# Patient Record
Sex: MALE | Race: NATIVE HAWAIIAN OR OTHER PACIFIC ISLANDER | HISPANIC OR LATINO | URBAN - METROPOLITAN AREA
[De-identification: names, ages, dates, MRNs, and addresses within clinical notes are randomized per-mention and may not be internally consistent; named-entity substitution may affect disease eponyms.]

---

## 2024-04-24 ENCOUNTER — INPATIENT (INPATIENT)
Facility: HOSPITAL | Age: 29
LOS: 4 days | Discharge: HOME | DRG: 494 | End: 2024-04-29
Attending: STUDENT IN AN ORGANIZED HEALTH CARE EDUCATION/TRAINING PROGRAM | Admitting: STUDENT IN AN ORGANIZED HEALTH CARE EDUCATION/TRAINING PROGRAM
Payer: SELF-PAY

## 2024-04-24 VITALS
SYSTOLIC BLOOD PRESSURE: 122 MMHG | HEART RATE: 100 BPM | RESPIRATION RATE: 20 BRPM | DIASTOLIC BLOOD PRESSURE: 74 MMHG | OXYGEN SATURATION: 100 % | TEMPERATURE: 98 F | WEIGHT: 169.98 LBS | HEIGHT: 67 IN

## 2024-04-24 DIAGNOSIS — S82.209A UNSPECIFIED FRACTURE OF SHAFT OF UNSPECIFIED TIBIA, INITIAL ENCOUNTER FOR CLOSED FRACTURE: ICD-10-CM

## 2024-04-24 LAB
ALBUMIN SERPL ELPH-MCNC: 4.6 G/DL — SIGNIFICANT CHANGE UP (ref 3.3–5)
ALP SERPL-CCNC: 78 U/L — SIGNIFICANT CHANGE UP (ref 40–120)
ALT FLD-CCNC: 23 U/L — SIGNIFICANT CHANGE UP (ref 10–45)
ANION GAP SERPL CALC-SCNC: 18 MMOL/L — HIGH (ref 5–17)
APTT BLD: 31.1 SEC — SIGNIFICANT CHANGE UP (ref 24.5–35.6)
AST SERPL-CCNC: 45 U/L — HIGH (ref 10–40)
BASOPHILS # BLD AUTO: 0.06 K/UL — SIGNIFICANT CHANGE UP (ref 0–0.2)
BASOPHILS NFR BLD AUTO: 0.5 % — SIGNIFICANT CHANGE UP (ref 0–2)
BILIRUB SERPL-MCNC: 0.5 MG/DL — SIGNIFICANT CHANGE UP (ref 0.2–1.2)
BLD GP AB SCN SERPL QL: NEGATIVE — SIGNIFICANT CHANGE UP
BUN SERPL-MCNC: 7 MG/DL — SIGNIFICANT CHANGE UP (ref 7–23)
CALCIUM SERPL-MCNC: 8.4 MG/DL — SIGNIFICANT CHANGE UP (ref 8.4–10.5)
CHLORIDE SERPL-SCNC: 98 MMOL/L — SIGNIFICANT CHANGE UP (ref 96–108)
CO2 SERPL-SCNC: 21 MMOL/L — LOW (ref 22–31)
CREAT SERPL-MCNC: 0.56 MG/DL — SIGNIFICANT CHANGE UP (ref 0.5–1.3)
EGFR: 139 ML/MIN/1.73M2 — SIGNIFICANT CHANGE UP
EOSINOPHIL # BLD AUTO: 0.01 K/UL — SIGNIFICANT CHANGE UP (ref 0–0.5)
EOSINOPHIL NFR BLD AUTO: 0.1 % — SIGNIFICANT CHANGE UP (ref 0–6)
ETHANOL SERPL-MCNC: 337 MG/DL — HIGH (ref 0–10)
GLUCOSE SERPL-MCNC: 103 MG/DL — HIGH (ref 70–99)
HCT VFR BLD CALC: 39.9 % — SIGNIFICANT CHANGE UP (ref 39–50)
HGB BLD-MCNC: 14 G/DL — SIGNIFICANT CHANGE UP (ref 13–17)
IMM GRANULOCYTES NFR BLD AUTO: 0.3 % — SIGNIFICANT CHANGE UP (ref 0–0.9)
INR BLD: 1 RATIO — SIGNIFICANT CHANGE UP (ref 0.85–1.18)
LACTATE SERPL-SCNC: 2.1 MMOL/L — HIGH (ref 0.5–2)
LIDOCAIN IGE QN: 22 U/L — SIGNIFICANT CHANGE UP (ref 7–60)
LYMPHOCYTES # BLD AUTO: 1.21 K/UL — SIGNIFICANT CHANGE UP (ref 1–3.3)
LYMPHOCYTES # BLD AUTO: 10.5 % — LOW (ref 13–44)
MCHC RBC-ENTMCNC: 31 PG — SIGNIFICANT CHANGE UP (ref 27–34)
MCHC RBC-ENTMCNC: 35.1 GM/DL — SIGNIFICANT CHANGE UP (ref 32–36)
MCV RBC AUTO: 88.3 FL — SIGNIFICANT CHANGE UP (ref 80–100)
MONOCYTES # BLD AUTO: 0.44 K/UL — SIGNIFICANT CHANGE UP (ref 0–0.9)
MONOCYTES NFR BLD AUTO: 3.8 % — SIGNIFICANT CHANGE UP (ref 2–14)
NEUTROPHILS # BLD AUTO: 9.79 K/UL — HIGH (ref 1.8–7.4)
NEUTROPHILS NFR BLD AUTO: 84.8 % — HIGH (ref 43–77)
NRBC # BLD: 0 /100 WBCS — SIGNIFICANT CHANGE UP (ref 0–0)
PLATELET # BLD AUTO: 285 K/UL — SIGNIFICANT CHANGE UP (ref 150–400)
POTASSIUM SERPL-MCNC: 3.9 MMOL/L — SIGNIFICANT CHANGE UP (ref 3.5–5.3)
POTASSIUM SERPL-SCNC: 3.9 MMOL/L — SIGNIFICANT CHANGE UP (ref 3.5–5.3)
PROT SERPL-MCNC: 7.1 G/DL — SIGNIFICANT CHANGE UP (ref 6–8.3)
PROTHROM AB SERPL-ACNC: 10.5 SEC — SIGNIFICANT CHANGE UP (ref 9.5–13)
RBC # BLD: 4.52 M/UL — SIGNIFICANT CHANGE UP (ref 4.2–5.8)
RBC # FLD: 14.7 % — HIGH (ref 10.3–14.5)
RH IG SCN BLD-IMP: POSITIVE — SIGNIFICANT CHANGE UP
SODIUM SERPL-SCNC: 137 MMOL/L — SIGNIFICANT CHANGE UP (ref 135–145)
WBC # BLD: 11.55 K/UL — HIGH (ref 3.8–10.5)
WBC # FLD AUTO: 11.55 K/UL — HIGH (ref 3.8–10.5)

## 2024-04-24 PROCEDURE — 99221 1ST HOSP IP/OBS SF/LOW 40: CPT | Mod: 57

## 2024-04-24 PROCEDURE — 71045 X-RAY EXAM CHEST 1 VIEW: CPT | Mod: 26

## 2024-04-24 PROCEDURE — 76377 3D RENDER W/INTRP POSTPROCES: CPT | Mod: 26

## 2024-04-24 PROCEDURE — 73610 X-RAY EXAM OF ANKLE: CPT | Mod: 26,LT

## 2024-04-24 PROCEDURE — 73552 X-RAY EXAM OF FEMUR 2/>: CPT | Mod: 26,LT

## 2024-04-24 PROCEDURE — 73590 X-RAY EXAM OF LOWER LEG: CPT | Mod: 26,LT

## 2024-04-24 PROCEDURE — 73562 X-RAY EXAM OF KNEE 3: CPT | Mod: 26,LT

## 2024-04-24 PROCEDURE — 73502 X-RAY EXAM HIP UNI 2-3 VIEWS: CPT | Mod: 26,LT

## 2024-04-24 PROCEDURE — 73590 X-RAY EXAM OF LOWER LEG: CPT | Mod: 26,LT,77

## 2024-04-24 PROCEDURE — 74177 CT ABD & PELVIS W/CONTRAST: CPT | Mod: 26,MC

## 2024-04-24 PROCEDURE — 93010 ELECTROCARDIOGRAM REPORT: CPT

## 2024-04-24 PROCEDURE — 73610 X-RAY EXAM OF ANKLE: CPT | Mod: 26,LT,77

## 2024-04-24 PROCEDURE — 99285 EMERGENCY DEPT VISIT HI MDM: CPT

## 2024-04-24 PROCEDURE — 71260 CT THORAX DX C+: CPT | Mod: 26,MC

## 2024-04-24 PROCEDURE — 73130 X-RAY EXAM OF HAND: CPT | Mod: 26,50

## 2024-04-24 PROCEDURE — 72125 CT NECK SPINE W/O DYE: CPT | Mod: 26,MC

## 2024-04-24 PROCEDURE — 70450 CT HEAD/BRAIN W/O DYE: CPT | Mod: 26,MC

## 2024-04-24 PROCEDURE — 73700 CT LOWER EXTREMITY W/O DYE: CPT | Mod: 26,LT,MC

## 2024-04-24 PROCEDURE — 73630 X-RAY EXAM OF FOOT: CPT | Mod: 26,LT

## 2024-04-24 RX ORDER — INFLUENZA VIRUS VACCINE 15; 15; 15; 15 UG/.5ML; UG/.5ML; UG/.5ML; UG/.5ML
0.5 SUSPENSION INTRAMUSCULAR ONCE
Refills: 0 | Status: DISCONTINUED | OUTPATIENT
Start: 2024-04-24 | End: 2024-04-29

## 2024-04-24 RX ORDER — THIAMINE MONONITRATE (VIT B1) 100 MG
100 TABLET ORAL DAILY
Refills: 0 | Status: DISCONTINUED | OUTPATIENT
Start: 2024-04-24 | End: 2024-04-25

## 2024-04-24 RX ORDER — ACETAMINOPHEN 500 MG
975 TABLET ORAL EVERY 8 HOURS
Refills: 0 | Status: DISCONTINUED | OUTPATIENT
Start: 2024-04-24 | End: 2024-04-25

## 2024-04-24 RX ORDER — ACETAMINOPHEN 500 MG
1000 TABLET ORAL ONCE
Refills: 0 | Status: COMPLETED | OUTPATIENT
Start: 2024-04-24 | End: 2024-04-24

## 2024-04-24 RX ORDER — ACETAMINOPHEN 500 MG
650 TABLET ORAL EVERY 6 HOURS
Refills: 0 | Status: DISCONTINUED | OUTPATIENT
Start: 2024-04-24 | End: 2024-04-24

## 2024-04-24 RX ORDER — POLYETHYLENE GLYCOL 3350 17 G/17G
17 POWDER, FOR SOLUTION ORAL DAILY
Refills: 0 | Status: DISCONTINUED | OUTPATIENT
Start: 2024-04-24 | End: 2024-04-25

## 2024-04-24 RX ORDER — OXYCODONE HYDROCHLORIDE 5 MG/1
5 TABLET ORAL EVERY 4 HOURS
Refills: 0 | Status: DISCONTINUED | OUTPATIENT
Start: 2024-04-24 | End: 2024-04-25

## 2024-04-24 RX ORDER — SENNA PLUS 8.6 MG/1
2 TABLET ORAL AT BEDTIME
Refills: 0 | Status: DISCONTINUED | OUTPATIENT
Start: 2024-04-24 | End: 2024-04-25

## 2024-04-24 RX ORDER — SODIUM CHLORIDE 9 MG/ML
1000 INJECTION INTRAMUSCULAR; INTRAVENOUS; SUBCUTANEOUS
Refills: 0 | Status: DISCONTINUED | OUTPATIENT
Start: 2024-04-24 | End: 2024-04-24

## 2024-04-24 RX ORDER — TRAMADOL HYDROCHLORIDE 50 MG/1
50 TABLET ORAL EVERY 6 HOURS
Refills: 0 | Status: DISCONTINUED | OUTPATIENT
Start: 2024-04-24 | End: 2024-04-25

## 2024-04-24 RX ORDER — MORPHINE SULFATE 50 MG/1
4 CAPSULE, EXTENDED RELEASE ORAL ONCE
Refills: 0 | Status: DISCONTINUED | OUTPATIENT
Start: 2024-04-24 | End: 2024-04-24

## 2024-04-24 RX ORDER — SODIUM CHLORIDE 9 MG/ML
1000 INJECTION INTRAMUSCULAR; INTRAVENOUS; SUBCUTANEOUS
Refills: 0 | Status: DISCONTINUED | OUTPATIENT
Start: 2024-04-24 | End: 2024-04-25

## 2024-04-24 RX ORDER — OXYCODONE HYDROCHLORIDE 5 MG/1
10 TABLET ORAL EVERY 4 HOURS
Refills: 0 | Status: DISCONTINUED | OUTPATIENT
Start: 2024-04-24 | End: 2024-04-25

## 2024-04-24 RX ORDER — ONDANSETRON 8 MG/1
4 TABLET, FILM COATED ORAL EVERY 6 HOURS
Refills: 0 | Status: DISCONTINUED | OUTPATIENT
Start: 2024-04-24 | End: 2024-04-25

## 2024-04-24 RX ORDER — SODIUM CHLORIDE 9 MG/ML
1000 INJECTION INTRAMUSCULAR; INTRAVENOUS; SUBCUTANEOUS ONCE
Refills: 0 | Status: COMPLETED | OUTPATIENT
Start: 2024-04-24 | End: 2024-04-24

## 2024-04-24 RX ORDER — CEFAZOLIN SODIUM 1 G
2000 VIAL (EA) INJECTION ONCE
Refills: 0 | Status: COMPLETED | OUTPATIENT
Start: 2024-04-24 | End: 2024-04-24

## 2024-04-24 RX ADMIN — Medication 100 MILLIGRAM(S): at 19:15

## 2024-04-24 RX ADMIN — Medication 975 MILLIGRAM(S): at 21:37

## 2024-04-24 RX ADMIN — SODIUM CHLORIDE 125 MILLILITER(S): 9 INJECTION INTRAMUSCULAR; INTRAVENOUS; SUBCUTANEOUS at 20:42

## 2024-04-24 RX ADMIN — MORPHINE SULFATE 4 MILLIGRAM(S): 50 CAPSULE, EXTENDED RELEASE ORAL at 18:12

## 2024-04-24 RX ADMIN — MORPHINE SULFATE 4 MILLIGRAM(S): 50 CAPSULE, EXTENDED RELEASE ORAL at 17:35

## 2024-04-24 RX ADMIN — SODIUM CHLORIDE 1000 MILLILITER(S): 9 INJECTION INTRAMUSCULAR; INTRAVENOUS; SUBCUTANEOUS at 16:04

## 2024-04-24 RX ADMIN — Medication 400 MILLIGRAM(S): at 16:04

## 2024-04-24 RX ADMIN — Medication 1000 MILLIGRAM(S): at 17:10
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
## 2024-04-24 NOTE — ED PROVIDER NOTE - PHYSICAL EXAMINATION
CONSTITUTIONAL: Patient is awake, alert and oriented x 2, intoxicated;   HEAD: NCAT  EYES: PERRL bilaterally,   ENT: Airway patent, Nasal mucosa clear.  NECK: Supple,   LUNGS: CTA B/L,  HEART: RRR.+S1S2   ABDOMEN: Soft, non-tender to palpation throughout all four quadrants,  MSK: unable to range left LE, obvious deformity noted to left ankle, distal pulses intact, brisk capillary refill, no midline ttp of cervical, thoracic or lumbar spine;   SKIN: excoriations to left upper extremity, abrasion to left knee, dried blood to fingernails left hand;   NEURO: intoxicated
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
## 2024-04-24 NOTE — ED ADULT NURSE NOTE - NSFALLRISKINTERV_ED_ALL_ED
Assistance OOB with selected safe patient handling equipment if applicable/Assistance with ambulation/Communicate fall risk and risk factors to all staff, patient, and family/Monitor gait and stability/Provide visual cue: yellow wristband, yellow gown, etc/Reinforce activity limits and safety measures with patient and family/Call bell, personal items and telephone in reach/Instruct patient to call for assistance before getting out of bed/chair/stretcher/Non-slip footwear applied when patient is off stretcher/[place] to call system/Physically safe environment - no spills, clutter or unnecessary equipment/Purposeful Proactive Rounding/Room/bathroom lighting operational, light cord in reach

## 2024-04-24 NOTE — ED PROVIDER NOTE - PLAN OF CARE
L closed Displaced transverse fracture of the medial malleolus. L fibula fx Displaced transverse fracture of the medial malleolus.    Acute, comminuted displaced fracture of the distal fibular diaphysis with   mild foreshortening at the fracture site, anterolateral displacement of   the distal fracture fragment, and apex anterior angulation.    Widening of the syndesmosis, compatible with syndesmotic injury.

## 2024-04-24 NOTE — ED ADULT NURSE REASSESSMENT NOTE - NS ED NURSE REASSESS COMMENT FT1
Handoff taken from RN Jaye in gold. Introduced self to Patient, resting comfortably, breathing unlabored, VSS, no signs of acute distress, side rails raised, call bell within reach, comfort and safety maintained. Seen by surgery, awaiting recs, updated pt on POC. No complaints at this time, denies pain of LLE.

## 2024-04-24 NOTE — PATIENT PROFILE ADULT - FALL HARM RISK - HARM RISK INTERVENTIONS
Assistance with ambulation/Assistance OOB with selected safe patient handling equipment/Communicate Risk of Fall with Harm to all staff/Discuss with provider need for PT consult/Monitor gait and stability/Reinforce activity limits and safety measures with patient and family/Tailored Fall Risk Interventions/Visual Cue: Yellow wristband and red socks/Bed in lowest position, wheels locked, appropriate side rails in place/Call bell, personal items and telephone in reach/Instruct patient to call for assistance before getting out of bed or chair/Non-slip footwear when patient is out of bed/[place] to call system/Physically safe environment - no spills, clutter or unnecessary equipment/Purposeful Proactive Rounding/Room/bathroom lighting operational, light cord in reach

## 2024-04-25 LAB
ANION GAP SERPL CALC-SCNC: 10 MMOL/L — SIGNIFICANT CHANGE UP (ref 5–17)
ANION GAP SERPL CALC-SCNC: 14 MMOL/L — SIGNIFICANT CHANGE UP (ref 5–17)
ANION GAP SERPL CALC-SCNC: 14 MMOL/L — SIGNIFICANT CHANGE UP (ref 5–17)
APTT BLD: 31.4 SEC — SIGNIFICANT CHANGE UP (ref 24.5–35.6)
BASE EXCESS BLDV CALC-SCNC: -0.4 MMOL/L — SIGNIFICANT CHANGE UP (ref -2–3)
BASE EXCESS BLDV CALC-SCNC: -1.8 MMOL/L — SIGNIFICANT CHANGE UP (ref -2–3)
BASOPHILS # BLD AUTO: 0.02 K/UL — SIGNIFICANT CHANGE UP (ref 0–0.2)
BASOPHILS NFR BLD AUTO: 0.3 % — SIGNIFICANT CHANGE UP (ref 0–2)
BLD GP AB SCN SERPL QL: NEGATIVE — SIGNIFICANT CHANGE UP
BUN SERPL-MCNC: 10 MG/DL — SIGNIFICANT CHANGE UP (ref 7–23)
BUN SERPL-MCNC: 8 MG/DL — SIGNIFICANT CHANGE UP (ref 7–23)
BUN SERPL-MCNC: 8 MG/DL — SIGNIFICANT CHANGE UP (ref 7–23)
CA-I SERPL-SCNC: 1.11 MMOL/L — LOW (ref 1.15–1.33)
CA-I SERPL-SCNC: 1.17 MMOL/L — SIGNIFICANT CHANGE UP (ref 1.15–1.33)
CALCIUM SERPL-MCNC: 8.3 MG/DL — LOW (ref 8.4–10.5)
CALCIUM SERPL-MCNC: 8.4 MG/DL — SIGNIFICANT CHANGE UP (ref 8.4–10.5)
CALCIUM SERPL-MCNC: 8.4 MG/DL — SIGNIFICANT CHANGE UP (ref 8.4–10.5)
CHLORIDE BLDV-SCNC: 102 MMOL/L — SIGNIFICANT CHANGE UP (ref 96–108)
CHLORIDE BLDV-SCNC: 103 MMOL/L — SIGNIFICANT CHANGE UP (ref 96–108)
CHLORIDE SERPL-SCNC: 103 MMOL/L — SIGNIFICANT CHANGE UP (ref 96–108)
CHLORIDE SERPL-SCNC: 104 MMOL/L — SIGNIFICANT CHANGE UP (ref 96–108)
CHLORIDE SERPL-SCNC: 104 MMOL/L — SIGNIFICANT CHANGE UP (ref 96–108)
CO2 BLDV-SCNC: 24 MMOL/L — SIGNIFICANT CHANGE UP (ref 22–26)
CO2 BLDV-SCNC: 26 MMOL/L — SIGNIFICANT CHANGE UP (ref 22–26)
CO2 SERPL-SCNC: 21 MMOL/L — LOW (ref 22–31)
CO2 SERPL-SCNC: 21 MMOL/L — LOW (ref 22–31)
CO2 SERPL-SCNC: 22 MMOL/L — SIGNIFICANT CHANGE UP (ref 22–31)
CREAT SERPL-MCNC: 0.57 MG/DL — SIGNIFICANT CHANGE UP (ref 0.5–1.3)
CREAT SERPL-MCNC: 0.61 MG/DL — SIGNIFICANT CHANGE UP (ref 0.5–1.3)
CREAT SERPL-MCNC: 0.61 MG/DL — SIGNIFICANT CHANGE UP (ref 0.5–1.3)
EGFR: 135 ML/MIN/1.73M2 — SIGNIFICANT CHANGE UP
EGFR: 135 ML/MIN/1.73M2 — SIGNIFICANT CHANGE UP
EGFR: 138 ML/MIN/1.73M2 — SIGNIFICANT CHANGE UP
EOSINOPHIL # BLD AUTO: 0 K/UL — SIGNIFICANT CHANGE UP (ref 0–0.5)
EOSINOPHIL NFR BLD AUTO: 0 % — SIGNIFICANT CHANGE UP (ref 0–6)
ETHANOL SERPL-MCNC: 104 MG/DL — HIGH (ref 0–10)
ETHANOL SERPL-MCNC: <10 MG/DL — SIGNIFICANT CHANGE UP (ref 0–10)
GAS PNL BLDV: 135 MMOL/L — LOW (ref 136–145)
GAS PNL BLDV: 137 MMOL/L — SIGNIFICANT CHANGE UP (ref 136–145)
GAS PNL BLDV: SIGNIFICANT CHANGE UP
GAS PNL BLDV: SIGNIFICANT CHANGE UP
GLUCOSE BLDV-MCNC: 100 MG/DL — HIGH (ref 70–99)
GLUCOSE BLDV-MCNC: 99 MG/DL — SIGNIFICANT CHANGE UP (ref 70–99)
GLUCOSE SERPL-MCNC: 110 MG/DL — HIGH (ref 70–99)
GLUCOSE SERPL-MCNC: 110 MG/DL — HIGH (ref 70–99)
GLUCOSE SERPL-MCNC: 129 MG/DL — HIGH (ref 70–99)
HCO3 BLDV-SCNC: 23 MMOL/L — SIGNIFICANT CHANGE UP (ref 22–29)
HCO3 BLDV-SCNC: 25 MMOL/L — SIGNIFICANT CHANGE UP (ref 22–29)
HCT VFR BLD CALC: 36.7 % — LOW (ref 39–50)
HCT VFR BLD CALC: 38.1 % — LOW (ref 39–50)
HCT VFR BLDA CALC: 39 % — SIGNIFICANT CHANGE UP (ref 39–51)
HCT VFR BLDA CALC: 39 % — SIGNIFICANT CHANGE UP (ref 39–51)
HGB BLD CALC-MCNC: 12.9 G/DL — SIGNIFICANT CHANGE UP (ref 12.6–17.4)
HGB BLD CALC-MCNC: 13 G/DL — SIGNIFICANT CHANGE UP (ref 12.6–17.4)
HGB BLD-MCNC: 12 G/DL — LOW (ref 13–17)
HGB BLD-MCNC: 12.7 G/DL — LOW (ref 13–17)
IMM GRANULOCYTES NFR BLD AUTO: 0.3 % — SIGNIFICANT CHANGE UP (ref 0–0.9)
INR BLD: 1.05 RATIO — SIGNIFICANT CHANGE UP (ref 0.85–1.18)
LACTATE BLDV-MCNC: 0.6 MMOL/L — SIGNIFICANT CHANGE UP (ref 0.5–2)
LACTATE BLDV-MCNC: 2.1 MMOL/L — HIGH (ref 0.5–2)
LYMPHOCYTES # BLD AUTO: 0.37 K/UL — LOW (ref 1–3.3)
LYMPHOCYTES # BLD AUTO: 5.8 % — LOW (ref 13–44)
MAGNESIUM SERPL-MCNC: 2 MG/DL — SIGNIFICANT CHANGE UP (ref 1.6–2.6)
MCHC RBC-ENTMCNC: 30 PG — SIGNIFICANT CHANGE UP (ref 27–34)
MCHC RBC-ENTMCNC: 30.2 PG — SIGNIFICANT CHANGE UP (ref 27–34)
MCHC RBC-ENTMCNC: 32.7 GM/DL — SIGNIFICANT CHANGE UP (ref 32–36)
MCHC RBC-ENTMCNC: 33.3 GM/DL — SIGNIFICANT CHANGE UP (ref 32–36)
MCV RBC AUTO: 90.7 FL — SIGNIFICANT CHANGE UP (ref 80–100)
MCV RBC AUTO: 91.8 FL — SIGNIFICANT CHANGE UP (ref 80–100)
MONOCYTES # BLD AUTO: 0.13 K/UL — SIGNIFICANT CHANGE UP (ref 0–0.9)
MONOCYTES NFR BLD AUTO: 2 % — SIGNIFICANT CHANGE UP (ref 2–14)
NEUTROPHILS # BLD AUTO: 5.84 K/UL — SIGNIFICANT CHANGE UP (ref 1.8–7.4)
NEUTROPHILS NFR BLD AUTO: 91.6 % — HIGH (ref 43–77)
NRBC # BLD: 0 /100 WBCS — SIGNIFICANT CHANGE UP (ref 0–0)
NRBC # BLD: 0 /100 WBCS — SIGNIFICANT CHANGE UP (ref 0–0)
PCO2 BLDV: 39 MMHG — LOW (ref 42–55)
PCO2 BLDV: 42 MMHG — SIGNIFICANT CHANGE UP (ref 42–55)
PH BLDV: 7.38 — SIGNIFICANT CHANGE UP (ref 7.32–7.43)
PH BLDV: 7.38 — SIGNIFICANT CHANGE UP (ref 7.32–7.43)
PHOSPHATE SERPL-MCNC: 3.2 MG/DL — SIGNIFICANT CHANGE UP (ref 2.5–4.5)
PLATELET # BLD AUTO: 232 K/UL — SIGNIFICANT CHANGE UP (ref 150–400)
PLATELET # BLD AUTO: 259 K/UL — SIGNIFICANT CHANGE UP (ref 150–400)
PO2 BLDV: 89 MMHG — HIGH (ref 25–45)
PO2 BLDV: 95 MMHG — HIGH (ref 25–45)
POTASSIUM BLDV-SCNC: 3.6 MMOL/L — SIGNIFICANT CHANGE UP (ref 3.5–5.1)
POTASSIUM BLDV-SCNC: 3.9 MMOL/L — SIGNIFICANT CHANGE UP (ref 3.5–5.1)
POTASSIUM SERPL-MCNC: 3.7 MMOL/L — SIGNIFICANT CHANGE UP (ref 3.5–5.3)
POTASSIUM SERPL-MCNC: 3.7 MMOL/L — SIGNIFICANT CHANGE UP (ref 3.5–5.3)
POTASSIUM SERPL-MCNC: 4.5 MMOL/L — SIGNIFICANT CHANGE UP (ref 3.5–5.3)
POTASSIUM SERPL-SCNC: 3.7 MMOL/L — SIGNIFICANT CHANGE UP (ref 3.5–5.3)
POTASSIUM SERPL-SCNC: 3.7 MMOL/L — SIGNIFICANT CHANGE UP (ref 3.5–5.3)
POTASSIUM SERPL-SCNC: 4.5 MMOL/L — SIGNIFICANT CHANGE UP (ref 3.5–5.3)
PROTHROM AB SERPL-ACNC: 11 SEC — SIGNIFICANT CHANGE UP (ref 9.5–13)
RBC # BLD: 4 M/UL — LOW (ref 4.2–5.8)
RBC # BLD: 4.2 M/UL — SIGNIFICANT CHANGE UP (ref 4.2–5.8)
RBC # FLD: 14.8 % — HIGH (ref 10.3–14.5)
RBC # FLD: 15.2 % — HIGH (ref 10.3–14.5)
RH IG SCN BLD-IMP: POSITIVE — SIGNIFICANT CHANGE UP
SAO2 % BLDV: 97.9 % — HIGH (ref 67–88)
SAO2 % BLDV: 98.7 % — HIGH (ref 67–88)
SODIUM SERPL-SCNC: 136 MMOL/L — SIGNIFICANT CHANGE UP (ref 135–145)
SODIUM SERPL-SCNC: 138 MMOL/L — SIGNIFICANT CHANGE UP (ref 135–145)
SODIUM SERPL-SCNC: 139 MMOL/L — SIGNIFICANT CHANGE UP (ref 135–145)
WBC # BLD: 6.38 K/UL — SIGNIFICANT CHANGE UP (ref 3.8–10.5)
WBC # BLD: 7.73 K/UL — SIGNIFICANT CHANGE UP (ref 3.8–10.5)
WBC # FLD AUTO: 6.38 K/UL — SIGNIFICANT CHANGE UP (ref 3.8–10.5)
WBC # FLD AUTO: 7.73 K/UL — SIGNIFICANT CHANGE UP (ref 3.8–10.5)

## 2024-04-25 PROCEDURE — 27814 TREATMENT OF ANKLE FRACTURE: CPT | Mod: RT

## 2024-04-25 PROCEDURE — 27829 TREAT LOWER LEG JOINT: CPT | Mod: RT

## 2024-04-25 DEVICE — IMPLANTABLE DEVICE: Type: IMPLANTABLE DEVICE | Site: LEFT | Status: FUNCTIONAL

## 2024-04-25 DEVICE — SCREW FT 3.5X12MM: Type: IMPLANTABLE DEVICE | Site: LEFT | Status: FUNCTIONAL

## 2024-04-25 DEVICE — GUIDEWIRE UNTHREADED 1.4X150MM: Type: IMPLANTABLE DEVICE | Site: LEFT | Status: FUNCTIONAL

## 2024-04-25 DEVICE — K-WIRE STRYKER 1.25MM X 150MM: Type: IMPLANTABLE DEVICE | Site: LEFT | Status: FUNCTIONAL

## 2024-04-25 DEVICE — SCREW 3.5X14MM: Type: IMPLANTABLE DEVICE | Site: LEFT | Status: FUNCTIONAL

## 2024-04-25 DEVICE — KIT REPAIR TIGHTROPE W/ DRV SYNDESMOSIS: Type: IMPLANTABLE DEVICE | Site: LEFT | Status: FUNCTIONAL

## 2024-04-25 RX ORDER — SENNA PLUS 8.6 MG/1
2 TABLET ORAL AT BEDTIME
Refills: 0 | Status: DISCONTINUED | OUTPATIENT
Start: 2024-04-25 | End: 2024-04-29

## 2024-04-25 RX ORDER — ONDANSETRON 8 MG/1
4 TABLET, FILM COATED ORAL EVERY 6 HOURS
Refills: 0 | Status: DISCONTINUED | OUTPATIENT
Start: 2024-04-25 | End: 2024-04-29

## 2024-04-25 RX ORDER — ONDANSETRON 8 MG/1
4 TABLET, FILM COATED ORAL ONCE
Refills: 0 | Status: DISCONTINUED | OUTPATIENT
Start: 2024-04-25 | End: 2024-04-25

## 2024-04-25 RX ORDER — PANTOPRAZOLE SODIUM 20 MG/1
40 TABLET, DELAYED RELEASE ORAL
Refills: 0 | Status: DISCONTINUED | OUTPATIENT
Start: 2024-04-25 | End: 2024-04-29

## 2024-04-25 RX ORDER — TRAMADOL HYDROCHLORIDE 50 MG/1
50 TABLET ORAL EVERY 6 HOURS
Refills: 0 | Status: DISCONTINUED | OUTPATIENT
Start: 2024-04-25 | End: 2024-04-29

## 2024-04-25 RX ORDER — OXYCODONE HYDROCHLORIDE 5 MG/1
10 TABLET ORAL EVERY 4 HOURS
Refills: 0 | Status: DISCONTINUED | OUTPATIENT
Start: 2024-04-25 | End: 2024-04-29

## 2024-04-25 RX ORDER — CEFAZOLIN SODIUM 1 G
2000 VIAL (EA) INJECTION EVERY 8 HOURS
Refills: 0 | Status: COMPLETED | OUTPATIENT
Start: 2024-04-25 | End: 2024-04-25

## 2024-04-25 RX ORDER — SODIUM CHLORIDE 9 MG/ML
1000 INJECTION INTRAMUSCULAR; INTRAVENOUS; SUBCUTANEOUS
Refills: 0 | Status: DISCONTINUED | OUTPATIENT
Start: 2024-04-25 | End: 2024-04-26

## 2024-04-25 RX ORDER — POLYETHYLENE GLYCOL 3350 17 G/17G
17 POWDER, FOR SOLUTION ORAL DAILY
Refills: 0 | Status: DISCONTINUED | OUTPATIENT
Start: 2024-04-25 | End: 2024-04-29

## 2024-04-25 RX ORDER — OXYCODONE HYDROCHLORIDE 5 MG/1
5 TABLET ORAL EVERY 4 HOURS
Refills: 0 | Status: DISCONTINUED | OUTPATIENT
Start: 2024-04-25 | End: 2024-04-29

## 2024-04-25 RX ORDER — ACETAMINOPHEN 500 MG
650 TABLET ORAL EVERY 6 HOURS
Refills: 0 | Status: DISCONTINUED | OUTPATIENT
Start: 2024-04-25 | End: 2024-04-29

## 2024-04-25 RX ORDER — ACETAMINOPHEN 500 MG
1000 TABLET ORAL ONCE
Refills: 0 | Status: COMPLETED | OUTPATIENT
Start: 2024-04-25 | End: 2024-04-25

## 2024-04-25 RX ORDER — ASPIRIN/CALCIUM CARB/MAGNESIUM 324 MG
81 TABLET ORAL
Refills: 0 | Status: DISCONTINUED | OUTPATIENT
Start: 2024-04-25 | End: 2024-04-29

## 2024-04-25 RX ORDER — HYDROMORPHONE HYDROCHLORIDE 2 MG/ML
0.5 INJECTION INTRAMUSCULAR; INTRAVENOUS; SUBCUTANEOUS
Refills: 0 | Status: DISCONTINUED | OUTPATIENT
Start: 2024-04-25 | End: 2024-04-25

## 2024-04-25 RX ADMIN — Medication 975 MILLIGRAM(S): at 05:06

## 2024-04-25 RX ADMIN — SENNA PLUS 2 TABLET(S): 8.6 TABLET ORAL at 23:40

## 2024-04-25 RX ADMIN — Medication 400 MILLIGRAM(S): at 12:28

## 2024-04-25 RX ADMIN — Medication 1000 MILLIGRAM(S): at 12:45

## 2024-04-25 RX ADMIN — Medication 650 MILLIGRAM(S): at 18:41

## 2024-04-25 RX ADMIN — OXYCODONE HYDROCHLORIDE 10 MILLIGRAM(S): 5 TABLET ORAL at 21:32

## 2024-04-25 RX ADMIN — Medication 81 MILLIGRAM(S): at 17:53

## 2024-04-25 RX ADMIN — Medication 100 MILLIGRAM(S): at 23:42

## 2024-04-25 RX ADMIN — HYDROMORPHONE HYDROCHLORIDE 0.5 MILLIGRAM(S): 2 INJECTION INTRAMUSCULAR; INTRAVENOUS; SUBCUTANEOUS at 12:30

## 2024-04-25 RX ADMIN — Medication 650 MILLIGRAM(S): at 23:41

## 2024-04-25 RX ADMIN — OXYCODONE HYDROCHLORIDE 10 MILLIGRAM(S): 5 TABLET ORAL at 03:33

## 2024-04-25 RX ADMIN — SODIUM CHLORIDE 125 MILLILITER(S): 9 INJECTION INTRAMUSCULAR; INTRAVENOUS; SUBCUTANEOUS at 12:10

## 2024-04-25 RX ADMIN — OXYCODONE HYDROCHLORIDE 10 MILLIGRAM(S): 5 TABLET ORAL at 02:33

## 2024-04-25 RX ADMIN — Medication 650 MILLIGRAM(S): at 17:53

## 2024-04-25 RX ADMIN — OXYCODONE HYDROCHLORIDE 10 MILLIGRAM(S): 5 TABLET ORAL at 20:32

## 2024-04-25 RX ADMIN — Medication 100 MILLIGRAM(S): at 15:26

## 2024-04-25 RX ADMIN — HYDROMORPHONE HYDROCHLORIDE 0.5 MILLIGRAM(S): 2 INJECTION INTRAMUSCULAR; INTRAVENOUS; SUBCUTANEOUS at 12:10

## 2024-04-25 NOTE — PRE-ANESTHESIA EVALUATION ADULT - NSANTHGENDERRD_ENT_A_CORE
History  Chief Complaint   Patient presents with   • Knee Pain     Pt c/o R knee pain since last night. Denies injury but states woke up suddenly with the pain and reporting increased swelling. Admits to having knee replaced approx 6 years ago. C/o numbness to his toes that began last night, pulses present to extremity      HPI patient is a 33-year-old male with history of osteoporosis and a right knee replacement. Patient reports now with pain and swelling of his right knee. Patient denies any trauma to the area. Denies any fever or chills. He denies any punctures or wounds to the area. He reports that he was walking up steps repeatedly for the last few days and may have exacerbated it with motion. Medical history of COPD, on inhaled steroids, patient reports on Coumadin. Family history noncontributory  Social history, smoker, denies drug abuse    Prior to Admission Medications   Prescriptions Last Dose Informant Patient Reported? Taking? Diclofenac Sodium (VOLTAREN) 1 %   Yes No   Sig: APPLY 2 GRAMS TO THE AFFECTED AREA(S) BY TOPICAL ROUTE 2-4 TIMES PER DAY   Probiotic CAPS   Yes No   Sig: Take 1 capsule daily   Trelegy Ellipta 200-62.5-25 MCG/ACT AEPB inhaler   Yes No   Sig: INHALE 1 PUFF BY MOUTH IN THE MORNING   Ventolin  (90 Base) MCG/ACT inhaler   Yes No   Sig: TAKE 2 PUFFS BY MOUTH EVERY 6 HOURS AS NEEDED FOR WHEEZE   albuterol (2.5 mg/3 mL) 0.083 % nebulizer solution   Yes No   Sig: Inhale 2.5 mg every 4 (four) hours as needed   cyanocobalamin 1,000 mcg/mL   Yes No   Sig: INJECT 1 ML (1,000 MCG TOTAL) INJECT INTO THE MUSCLE EVERY 2 (TWO) MONTHS.    esomeprazole (NexIUM) 40 MG capsule   Yes No   Sig: Take 40 mg by mouth every morning before breakfast   eszopiclone (LUNESTA) 2 mg tablet   Yes No   Sig: TAKE 1 TABLET BY MOUTH NIGHTLY AS NEEDED FOR SLEEP   famotidine (PEPCID) 40 MG tablet   Yes No   Sig: Take 40 mg by mouth daily   fluticasone (FLONASE) 50 mcg/act nasal spray   No No   Si spray into each nostril daily   Patient not taking: Reported on 2023   levothyroxine 50 mcg tablet   Yes No   Sig: Take 1 tablet by mouth once daily   oxybutynin (DITROPAN-XL) 10 MG 24 hr tablet   Yes No   Sig: Take 10 mg by mouth daily   predniSONE 10 mg tablet   Yes No   Sig: Take 10 mg by mouth daily   Patient not taking: Reported on 2023   rosuvastatin (CRESTOR) 10 MG tablet   Yes No   Sig: Take 1 tablet by mouth nightly   sildenafil (REVATIO) 20 mg tablet   Yes No   tadalafil (CIALIS) 20 MG tablet   Yes No   Sig: TAKE 1 TABLET BY MOUTH EVERY 24 HOURS AS NEEDED   tamsulosin (FLOMAX) 0.4 mg   Yes No   Si   warfarin (COUMADIN) 2.5 mg tablet   Yes No   Sig: Take 1.25-2.5 mg by mouth      Facility-Administered Medications: None       Past Medical History:   Diagnosis Date   • COPD (chronic obstructive pulmonary disease) ([address] )        History reviewed. No pertinent surgical history. History reviewed. No pertinent family history. I have reviewed and agree with the history as documented. E-Cigarette/Vaping     E-Cigarette/Vaping Substances     Social History     Tobacco Use   • Smoking status: Every Day     Packs/day: 0.50     Types: Cigarettes   • Smokeless tobacco: Never   Substance Use Topics   • Alcohol use: Yes   • Drug use: Not Currently       Review of Systems   Constitutional: Negative for fever. HENT: Negative for congestion. Eyes: Negative for pain and redness. Respiratory: Negative for cough and shortness of breath. Cardiovascular: Negative for chest pain. Gastrointestinal: Negative for abdominal pain and vomiting. Right knee pain and swelling    Physical Exam  Physical Exam  Vitals and nursing note reviewed. Constitutional:       Appearance: He is well-developed. HENT:      Head: Normocephalic.       Right Ear: External ear normal.      Left Ear: External ear normal.      Nose: Nose normal.   Eyes:      General: Lids are normal.      Pupils: Pupils are equal, round, and reactive to light. Pulmonary:      Effort: Pulmonary effort is normal. No respiratory distress. Musculoskeletal:         General: No deformity. Cervical back: Normal range of motion and neck supple. Comments: Right knee shows a postoperative surgical scar, there is diffuse tenderness. There is no redness or warmth, distal neurovascular tendon intact, good distal pulses both dorsalis pedis and posterior tibial.  Good capillary fill of all of his toes   Skin:     General: Skin is warm and dry. Neurological:      Mental Status: He is alert and oriented to person, place, and time. Vital Signs  ED Triage Vitals   Temperature Pulse Respirations Blood Pressure SpO2   08/26/23 1058 08/26/23 1058 08/26/23 1058 08/26/23 1058 08/26/23 1058   97.8 °F (36.6 °C) 75 18 135/77 95 %      Temp Source Heart Rate Source Patient Position - Orthostatic VS BP Location FiO2 (%)   08/26/23 1058 08/26/23 1058 -- 08/26/23 1058 --   Oral Monitor  Right arm       Pain Score       08/26/23 1116       7           Vitals:    08/26/23 1058   BP: 135/77   Pulse: 75         Visual Acuity      ED Medications  Medications   oxyCODONE-acetaminophen (PERCOCET) 5-325 mg per tablet 2 tablet (2 tablets Oral Given 8/26/23 1116)   predniSONE tablet 60 mg (60 mg Oral Given 8/26/23 1131)       Diagnostic Studies  Results Reviewed     None                 XR knee 4+ vw right injury    (Results Pending)              Procedures  Procedures         ED Course       X-ray of the right knee shows no particular joint effusion, prosthesis appears normal.  There is swelling of the prepatellar bursa. Discussed with the patient advised no arthrocentesis of his knee due to his joint replacement and risk of infection of his hardware. SBIRT 20yo+    Flowsheet Row Most Recent Value   Initial Alcohol Screen: US AUDIT-C     1. How often do you have a drink containing alcohol? 0 Filed at: 08/26/2023 1119   2.  How many drinks containing alcohol do you have on a typical day you are drinking? 0 Filed at: 08/26/2023 1119   3b. FEMALE Any Age, or MALE 65+: How often do you have 4 or more drinks on one occassion? 0 Filed at: 08/26/2023 1119   Audit-C Score 0 Filed at: 08/26/2023 1119   SANJIV: How many times in the past year have you. .. Used an illegal drug or used a prescription medication for non-medical reasons? Never Filed at: 08/26/2023 1119                    Medical Decision Making  Medical decision making 51-year-old male presents with some knee swelling which appears to be suprapatellar on the x-ray. Consistent with suprapatellar bursitis. Discussed anti-inflammatories patient is on Coumadin we will treat with prednisone. Discussed analgesics. Patient has taken Percocet in the past we will treat with Percocet. Discussed risk benefit of narcotics. Discussed follow-up and indications to return. Patient does follow-[facility] with [facility]opedics. Amount and/or Complexity of Data Reviewed  Radiology: ordered. Risk  Prescription drug management. Disposition  Final diagnoses:   Suprapatellar bursitis of right knee     Time reflects when diagnosis was documented in both MDM as applicable and the Disposition within this note     Time User Action Codes Description Comment    8/26/2023 11:24 AM Sebastian Melton Add [M70.51] Suprapatellar bursitis of right knee       ED Disposition     ED Disposition   Discharge    Condition   Stable    Date/Time   Sat Aug 26, 2023 11:24 AM    Comment   Johnna Montoya discharge to home/self care.                Follow-up Information     Follow up With Specialties Details Why Contact Info    Beena Rayo, [address]  [address] [place] [address] Road To [facility]  [phone]            Discharge Medication List as of 8/26/2023 11:26 AM      START taking these medications    Details   oxyCODONE-acetaminophen (PERCOCET) 5-325 mg per tablet Take 1 tablet by mouth every 6 (six) hours as needed for moderate pain or severe pain for up to 30 doses Max Daily Amount: 4 tablets, Starting Sat 8/26/2023, Normal      !! predniSONE 20 mg tablet Take 2 tablets (40 mg total) by mouth daily for 5 days, Starting Sat 8/26/2023, Until Thu 8/31/2023, Normal       !! - Potential duplicate medications found. Please discuss with provider. CONTINUE these medications which have NOT CHANGED    Details   albuterol (2.5 mg/3 mL) 0.083 % nebulizer solution Inhale 2.5 mg every 4 (four) hours as needed, Starting Tue 9/15/2020, Historical Med      cyanocobalamin 1,000 mcg/mL INJECT 1 ML (1,000 MCG TOTAL) INJECT INTO THE MUSCLE EVERY 2 (TWO) MONTHS. , Historical Med      Diclofenac Sodium (VOLTAREN) 1 % APPLY 2 GRAMS TO THE AFFECTED AREA(S) BY TOPICAL ROUTE 2-4 TIMES PER DAY, Historical Med      esomeprazole (NexIUM) 40 MG capsule Take 40 mg by mouth every morning before breakfast, Historical Med      eszopiclone (LUNESTA) 2 mg tablet TAKE 1 TABLET BY MOUTH NIGHTLY AS NEEDED FOR SLEEP, Historical Med      famotidine (PEPCID) 40 MG tablet Take 40 mg by mouth daily, Starting Mon 3/13/2023, Historical Med      fluticasone (FLONASE) 50 mcg/act nasal spray 1 spray into each nostril daily, Starting Sat 11/28/2020, Normal      levothyroxine 50 mcg tablet Take 1 tablet by mouth once daily, Historical Med      oxybutynin (DITROPAN-XL) 10 MG 24 hr tablet Take 10 mg by mouth daily, Starting Sat 12/24/2022, Historical Med      !! predniSONE 10 mg tablet Take 10 mg by mouth daily, Starting Sun 11/15/2020, Historical Med      Probiotic CAPS Take 1 capsule daily, Historical Med      rosuvastatin (CRESTOR) 10 MG tablet Take 1 tablet by mouth nightly, Historical Med      sildenafil (REVATIO) 20 mg tablet Historical Med      tadalafil (CIALIS) 20 MG tablet TAKE 1 TABLET BY MOUTH EVERY 24 HOURS AS NEEDED, Historical Med      tamsulosin (FLOMAX) 0.4 mg 2, Historical Med      Trelegy Ellipta 200-62.5-25 MCG/ACT AEPB inhaler INHALE 1 PUFF BY MOUTH IN THE MORNING, Historical Med      Ventolin  (90 Base) MCG/ACT inhaler TAKE 2 PUFFS BY MOUTH EVERY 6 HOURS AS NEEDED FOR WHEEZE, Historical Med      warfarin (COUMADIN) 2.5 mg tablet Take 1.25-2.5 mg by mouth, Starting Mon 7/20/2020, Historical Med       !! - Potential duplicate medications found. Please discuss with provider. No discharge procedures on file.     PDMP Review     None          ED Provider  Electronically Signed by           Jackson Estrada MD  08/26/23 8788 Yes

## 2024-04-25 NOTE — PROGRESS NOTE ADULT - SUBJECTIVE AND OBJECTIVE BOX
ORTHOPEDIC PROGRESS NOTE  POST OPERATIVE CHECK    Patient seen and evaluated at bedside.  Patient resting. Patient c/o left hand/digits pain. Patient reports pain well controlled.   Patient denies chest pain, SOB, dizziness, HA, N/V/D.    T(C): 36.8 (04-25-24 @ 14:49), Max: 37.1 (04-25-24 @ 00:02)  HR: 68 (04-25-24 @ 14:49) (65 - 100)  BP: 138/80 (04-25-24 @ 14:49) (110/60 - 138/80)  RR: 18 (04-25-24 @ 14:49) (15 - 20)  SpO2: 98% (04-25-24 @ 14:49) (95% - 100%)  Wt(kg): --    Exam:  Alert and oriented. No acute distress  Card: +S1/S2, RRR  Pulm: CTAB    Laterality: LLE  Trilam splint clean dry and intact  Compartments soft and nontender  +EHL/FHL  SILT  Distal extremity warm, cap refill < 3 seconds    Laterality: LUE  Motor intact shoulder, elbow, wrist, digits  TTP dorsal aspect of left hand and digits 3-4  SILT  Radial pulse +    Post op labs:                     12.0   6.38  )-----------( 232      ( 25 Apr 2024 11:08 )             36.7    04-25    136  |  104  |  10  ----------------------------<  129<H>  4.5   |  22  |  0.57    Ca    8.3<L>      25 Apr 2024 11:08  Phos  3.2     04-25  Mg     2.0     04-25    TPro  7.1  /  Alb  4.6  /  TBili  0.5  /  DBili  x   /  AST  45<H>  /  ALT  23  /  AlkPhos  78  04-24 ORTHOPEDIC PROGRESS NOTE  POST OPERATIVE CHECK    Patient seen and evaluated at bedside.  Patient resting. Patient reports left hand and digits pain. Patient reports pain well controlled.   Patient denies chest pain, SOB, dizziness, HA, N/V/D.    T(C): 36.8 (04-25-24 @ 14:49), Max: 37.1 (04-25-24 @ 00:02)  HR: 68 (04-25-24 @ 14:49) (65 - 100)  BP: 138/80 (04-25-24 @ 14:49) (110/60 - 138/80)  RR: 18 (04-25-24 @ 14:49) (15 - 20)  SpO2: 98% (04-25-24 @ 14:49) (95% - 100%)  Wt(kg): --    Exam:  Alert and oriented. No acute distress  Card: +S1/S2, RRR  Pulm: CTAB    Laterality: LLE  Trilam splint clean dry and intact  Compartments soft and nontender  +EHL/FHL  SILT  Distal extremity warm, cap refill < 3 seconds    Laterality: LUE  Motor intact shoulder, elbow, wrist, digits  TTP dorsal aspect of left hand and digits 3-4  SILT  Radial pulse +    Post op labs:                     12.0   6.38  )-----------( 232      ( 25 Apr 2024 11:08 )             36.7    04-25    136  |  104  |  10  ----------------------------<  129<H>  4.5   |  22  |  0.57    Ca    8.3<L>      25 Apr 2024 11:08  Phos  3.2     04-25  Mg     2.0     04-25    TPro  7.1  /  Alb  4.6  /  TBili  0.5  /  DBili  x   /  AST  45<H>  /  ALT  23  /  AlkPhos  78  04-24

## 2024-04-25 NOTE — BRIEF OPERATIVE NOTE - NSICDXBRIEFPROCEDURE_GEN_ALL_CORE_FT
PROCEDURES:  Open reduction and internal fixation (ORIF) of bimalleolar fracture of left ankle 25-Apr-2024 11:04:12  Tee Medina

## 2024-04-25 NOTE — PROGRESS NOTE ADULT - ASSESSMENT
A/P: 28 yo male s/p ORIF bimalleolar fracture of left ankle. Also with acute mildly displaced, comminuted fracture of the tuft of the fourth   distal phalanx- non operative. VSS. NAD.    - PT/OT - NWB LLE in trilam splint  - OOB  - Incentive spirometer encouraged  - DVT ppx: ASA 81 mg BID  - GI ppx: Protonix  - Ancef 24 hours post op  - Pain control/analgesia  - Continue current tx  - Follow up AM labs  - Dispo planning pending PT/OT ting Read PA-C  Orthopedic Surgery Team  Team Pager #[phone] A/P: 30 yo male s/p ORIF bimalleolar fracture of left ankle. Also with acute mildly displaced, comminuted fracture of the tuft of the fourth   distal phalanx-non operative. VSS. NAD.    - PT/OT - NWB LLE in trilam splint  - OOB  - Incentive spirometer encouraged  - DVT ppx: ASA 81 mg BID, SCDs  - GI ppx: Protonix  - Ancef 24 hours post op  - Pain control/analgesia  - Continue current tx  - Follow up AM labs  - Dispo planning pending PT/OT ting Read PA-C  Orthopedic Surgery Team  Team Pager #[phone]

## 2024-04-26 LAB
ANION GAP SERPL CALC-SCNC: 9 MMOL/L — SIGNIFICANT CHANGE UP (ref 5–17)
BASOPHILS # BLD AUTO: 0.01 K/UL — SIGNIFICANT CHANGE UP (ref 0–0.2)
BASOPHILS NFR BLD AUTO: 0.1 % — SIGNIFICANT CHANGE UP (ref 0–2)
BUN SERPL-MCNC: 7 MG/DL — SIGNIFICANT CHANGE UP (ref 7–23)
CALCIUM SERPL-MCNC: 8.5 MG/DL — SIGNIFICANT CHANGE UP (ref 8.4–10.5)
CHLORIDE SERPL-SCNC: 105 MMOL/L — SIGNIFICANT CHANGE UP (ref 96–108)
CO2 SERPL-SCNC: 25 MMOL/L — SIGNIFICANT CHANGE UP (ref 22–31)
CREAT SERPL-MCNC: 0.57 MG/DL — SIGNIFICANT CHANGE UP (ref 0.5–1.3)
EGFR: 138 ML/MIN/1.73M2 — SIGNIFICANT CHANGE UP
EOSINOPHIL # BLD AUTO: 0.02 K/UL — SIGNIFICANT CHANGE UP (ref 0–0.5)
EOSINOPHIL NFR BLD AUTO: 0.2 % — SIGNIFICANT CHANGE UP (ref 0–6)
GLUCOSE SERPL-MCNC: 115 MG/DL — HIGH (ref 70–99)
HCT VFR BLD CALC: 34.4 % — LOW (ref 39–50)
HGB BLD-MCNC: 11.7 G/DL — LOW (ref 13–17)
IMM GRANULOCYTES NFR BLD AUTO: 0.3 % — SIGNIFICANT CHANGE UP (ref 0–0.9)
LYMPHOCYTES # BLD AUTO: 1.62 K/UL — SIGNIFICANT CHANGE UP (ref 1–3.3)
LYMPHOCYTES # BLD AUTO: 18.8 % — SIGNIFICANT CHANGE UP (ref 13–44)
MCHC RBC-ENTMCNC: 31.2 PG — SIGNIFICANT CHANGE UP (ref 27–34)
MCHC RBC-ENTMCNC: 34 GM/DL — SIGNIFICANT CHANGE UP (ref 32–36)
MCV RBC AUTO: 91.7 FL — SIGNIFICANT CHANGE UP (ref 80–100)
MONOCYTES # BLD AUTO: 0.87 K/UL — SIGNIFICANT CHANGE UP (ref 0–0.9)
MONOCYTES NFR BLD AUTO: 10.1 % — SIGNIFICANT CHANGE UP (ref 2–14)
NEUTROPHILS # BLD AUTO: 6.08 K/UL — SIGNIFICANT CHANGE UP (ref 1.8–7.4)
NEUTROPHILS NFR BLD AUTO: 70.5 % — SIGNIFICANT CHANGE UP (ref 43–77)
NRBC # BLD: 0 /100 WBCS — SIGNIFICANT CHANGE UP (ref 0–0)
PLATELET # BLD AUTO: 223 K/UL — SIGNIFICANT CHANGE UP (ref 150–400)
POTASSIUM SERPL-MCNC: 4.1 MMOL/L — SIGNIFICANT CHANGE UP (ref 3.5–5.3)
POTASSIUM SERPL-SCNC: 4.1 MMOL/L — SIGNIFICANT CHANGE UP (ref 3.5–5.3)
RBC # BLD: 3.75 M/UL — LOW (ref 4.2–5.8)
RBC # FLD: 14.5 % — SIGNIFICANT CHANGE UP (ref 10.3–14.5)
SODIUM SERPL-SCNC: 139 MMOL/L — SIGNIFICANT CHANGE UP (ref 135–145)
WBC # BLD: 8.63 K/UL — SIGNIFICANT CHANGE UP (ref 3.8–10.5)
WBC # FLD AUTO: 8.63 K/UL — SIGNIFICANT CHANGE UP (ref 3.8–10.5)

## 2024-04-26 RX ORDER — ACETAMINOPHEN 500 MG
2 TABLET ORAL
Qty: 0 | Refills: 0 | DISCHARGE
Start: 2024-04-26

## 2024-04-26 RX ORDER — PANTOPRAZOLE SODIUM 20 MG/1
1 TABLET, DELAYED RELEASE ORAL
Qty: 10 | Refills: 0
Start: 2024-04-26 | End: 2024-05-05

## 2024-04-26 RX ORDER — ASPIRIN/CALCIUM CARB/MAGNESIUM 324 MG
1 TABLET ORAL
Qty: 60 | Refills: 0
Start: 2024-04-26 | End: 2024-05-25

## 2024-04-26 RX ORDER — OXYCODONE HYDROCHLORIDE 5 MG/1
1 TABLET ORAL
Qty: 42 | Refills: 0
Start: 2024-04-26 | End: 2024-05-02

## 2024-04-26 RX ORDER — SENNA PLUS 8.6 MG/1
2 TABLET ORAL
Qty: 0 | Refills: 0 | DISCHARGE
Start: 2024-04-26

## 2024-04-26 RX ADMIN — OXYCODONE HYDROCHLORIDE 5 MILLIGRAM(S): 5 TABLET ORAL at 18:49

## 2024-04-26 RX ADMIN — ONDANSETRON 4 MILLIGRAM(S): 8 TABLET, FILM COATED ORAL at 11:02

## 2024-04-26 RX ADMIN — OXYCODONE HYDROCHLORIDE 10 MILLIGRAM(S): 5 TABLET ORAL at 12:53

## 2024-04-26 RX ADMIN — OXYCODONE HYDROCHLORIDE 10 MILLIGRAM(S): 5 TABLET ORAL at 08:45

## 2024-04-26 RX ADMIN — OXYCODONE HYDROCHLORIDE 10 MILLIGRAM(S): 5 TABLET ORAL at 22:15

## 2024-04-26 RX ADMIN — Medication 81 MILLIGRAM(S): at 05:33

## 2024-04-26 RX ADMIN — Medication 650 MILLIGRAM(S): at 18:24

## 2024-04-26 RX ADMIN — OXYCODONE HYDROCHLORIDE 5 MILLIGRAM(S): 5 TABLET ORAL at 18:18

## 2024-04-26 RX ADMIN — Medication 650 MILLIGRAM(S): at 05:34

## 2024-04-26 RX ADMIN — OXYCODONE HYDROCHLORIDE 10 MILLIGRAM(S): 5 TABLET ORAL at 23:15

## 2024-04-26 RX ADMIN — Medication 650 MILLIGRAM(S): at 11:02

## 2024-04-26 RX ADMIN — Medication 2 MILLIGRAM(S): at 05:30

## 2024-04-26 RX ADMIN — POLYETHYLENE GLYCOL 3350 17 GRAM(S): 17 POWDER, FOR SOLUTION ORAL at 11:02

## 2024-04-26 RX ADMIN — PANTOPRAZOLE SODIUM 40 MILLIGRAM(S): 20 TABLET, DELAYED RELEASE ORAL at 05:33

## 2024-04-26 RX ADMIN — Medication 650 MILLIGRAM(S): at 18:18

## 2024-04-26 RX ADMIN — ONDANSETRON 4 MILLIGRAM(S): 8 TABLET, FILM COATED ORAL at 18:18

## 2024-04-26 RX ADMIN — Medication 650 MILLIGRAM(S): at 11:32

## 2024-04-26 RX ADMIN — Medication 650 MILLIGRAM(S): at 06:30

## 2024-04-26 RX ADMIN — OXYCODONE HYDROCHLORIDE 10 MILLIGRAM(S): 5 TABLET ORAL at 07:45

## 2024-04-26 RX ADMIN — OXYCODONE HYDROCHLORIDE 10 MILLIGRAM(S): 5 TABLET ORAL at 13:53

## 2024-04-26 RX ADMIN — Medication 81 MILLIGRAM(S): at 18:19

## 2024-04-26 RX ADMIN — Medication 650 MILLIGRAM(S): at 00:30

## 2024-04-26 NOTE — DISCHARGE NOTE PROVIDER - CARE PROVIDER_API CALL
Igor Martinez  Orthopaedic Surgery  [address] [place] [address]  Phone: [phone]  Fax: [phone]  Follow Up Time:

## 2024-04-26 NOTE — SBIRT NOTE ADULT - NSSBIRTBRIEFINTDET_GEN_A_CORE
Patient stated he is interested in quitting but declined any referral - stated he will quit himself.
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
## 2024-04-26 NOTE — PHYSICAL THERAPY INITIAL EVALUATION ADULT - ADDITIONAL COMMENTS
As per Pt, prior level of function was independent with no AD. Denies use of any other DME. Pt lives with wife and children in an apartment on the second floor. The apartment has ~10-11 stairs to enter.

## 2024-04-26 NOTE — CHART NOTE - NSCHARTNOTEFT_GEN_A_CORE
Due to the nature of this patient's injuries s/p L Ankle ORIF, patient will require standard wheelchair with elevated leg rest.  The beneficiary has a mobility limitation that significantly impairs his ability to participate in one or more MRADL's such as toileting, feeding, dressing, grooming and bathing in customary locations in the home.  The patients mobility limitation cannot be sufficiently resolved by the use of an appropriately fitted cane or walker.  The patient is unable to ambulate with a walker,  use of a manual wheelchair will significantly improve the beneficiary's ability to participate in MRADL's and the beneficiary will use in on a regular basis in the home.  The beneficiary is able and willing to use the wheelchair in the home.  The beneficiary has sufficient upper extremity function and other physical and mental capabilities needed to safely self-propel the manual wheelchair that is provided in the home during a typical day.      Elevating leg rest:  The patient has a musculoskeletal condition or the presence of a cast or brace which prevents the 90 degree flexion at the knee.    ANNETTA Minor PA-C  Orthopedic Surgery Team  Team Pager #[phone]

## 2024-04-26 NOTE — STUDENT SIGN OFF DOCUMENT - COPY OF STUDENT DOCUMENT REVIEW
PT
Alert-The patient is alert, awake and responds to voice. The patient is oriented to time, place, and person. The triage nurse is able to obtain subjective information.

## 2024-04-26 NOTE — DISCHARGE NOTE PROVIDER - NSDCCPTREATMENT_GEN_ALL_CORE_FT
PRINCIPAL PROCEDURE  Procedure: Open reduction and internal fixation (ORIF) of bimalleolar fracture of left ankle  Findings and Treatment:
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
## 2024-04-26 NOTE — DISCHARGE NOTE PROVIDER - REASON FOR ADMISSION
L ankle fracture/dislocation L ankle pain  Open Reduction Internal Fixation / Surgical Repair L ankle L ankle pain  Open Reduction Internal Fixation / Surgical Repair L Ankle L ankle pain

## 2024-04-26 NOTE — OCCUPATIONAL THERAPY INITIAL EVALUATION ADULT - LIVES WITH, PROFILE
Lives in apartment with wife and 2 children, 2 steps to enter, apartment on 2nd level 5-7 steps each flight +tub shower/children/spouse

## 2024-04-26 NOTE — DISCHARGE NOTE PROVIDER - NSDCFUADDINST_GEN_ALL_CORE_FT
Continue non-weight bearing on left lower extremity in Tri-Camilo splint. Keep surgical incision/TriLam splint clean and dry, follow up with Dr. Martinez in his office post operative day #14 (5/9/2024) 1. Continue STRICT non-weight bearing on left lower extremity ( L Leg)   2, Keep surgical incision/splint clean and dry,   3. ice to affected incision every 4-6 hours x 72 hours   4. Continue ECOTRIN 81mg by mouth 2x / day (at breakfast and at dinner) for a total of (4) WEEKS   5. sponge bath only until OK w/ Surgeon  Call MD for excessive pain, persistent fevers, pain NOT relieved by medications.  Do not drive or lift any heavy objects   6. Follow up with Dr. Martinez in his office post operative day #14 (5/9/2024)  Please call for an appointment

## 2024-04-26 NOTE — PROGRESS NOTE ADULT - SUBJECTIVE AND OBJECTIVE BOX
ORTHO PROGRESS NOTE    LanguageLine : [place] Islander (Won # [id number])    Patient is status post ORIF bimalleolar fracture of left ankle, POD # 1  Patient seen and examined at bedside. Patient denies SOB, CP, dizziness, HA, N/V/D.  Patient reports pain well controlled.    Vital Signs Last 24 Hrs  T(C): 37.1 (26 Apr 2024 04:38), Max: 37.3 (26 Apr 2024 00:10)  T(F): 98.7 (26 Apr 2024 04:38), Max: 99.1 (26 Apr 2024 00:10)  HR: 63 (26 Apr 2024 04:38) (60 - 86)  BP: 116/64 (26 Apr 2024 04:38) (105/64 - 138/80)  BP(mean): 77 (25 Apr 2024 12:45) (76 - 84)  RR: 18 (26 Apr 2024 04:38) (15 - 18)  SpO2: 99% (26 Apr 2024 04:38) (97% - 100%)    Parameters below as of 26 Apr 2024 04:38  Patient On (Oxygen Delivery Method): room air    Exam:  Gen: No apparent distress.  LLE: Trilam splint in place. Dressing (ACE) clean, dry, and intact.  LLE: Motor intact EHL/FHL. Sensation grossly intact to light touch in the distal extremities.  Compartments soft and nontender. Extremity warm, cap refill < 3 seconds.  RLE: Motor intact EHL/FHL/TA/GS. Sensation grossly intact to light touch in the distal extremities.  Compartments soft and nontender. Extremities warm. DP +. ORTHO PROGRESS NOTE    LanguageLine : [place]ian (Won # [id number])    Patient is status post ORIF bimalleolar fracture of left ankle, POD # 1  Patient seen and examined at bedside. Patient denies SOB, CP, dizziness, HA, N/V/D.  Patient reports pain well controlled.    Vital Signs Last 24 Hrs  T(C): 37.1 (26 Apr 2024 04:38), Max: 37.3 (26 Apr 2024 00:10)  T(F): 98.7 (26 Apr 2024 04:38), Max: 99.1 (26 Apr 2024 00:10)  HR: 63 (26 Apr 2024 04:38) (60 - 86)  BP: 116/64 (26 Apr 2024 04:38) (105/64 - 138/80)  BP(mean): 77 (25 Apr 2024 12:45) (76 - 84)  RR: 18 (26 Apr 2024 04:38) (15 - 18)  SpO2: 99% (26 Apr 2024 04:38) (97% - 100%)    Parameters below as of 26 Apr 2024 04:38  Patient On (Oxygen Delivery Method): room air    Exam:  Gen: No apparent distress.  LLE: Trilam splint in place. Dressing (ACE) clean, dry, and intact.  LLE: Motor intact EHL/FHL. Sensation grossly intact to light touch in the distal extremities.  Compartments soft and nontender. Extremity warm, cap refill < 3 seconds.  RLE: Motor intact EHL/FHL/TA/GS. Sensation grossly intact to light touch in the distal extremities.  Compartments soft and nontender. Extremity warm. DP +. ORTHO PROGRESS NOTE    LanguageLine : [place] Verdean (Won # [id number])    Patient is status post ORIF bimalleolar fracture of left ankle, POD # 1  Patient seen and examined at bedside. Patient denies SOB, CP, dizziness, N/V/D.  Patient reports headache, left lower extremity pain, and abdominal pain. Patient reports last bowel movement was this morning.  Patient reports pain well controlled.    Vital Signs Last 24 Hrs  T(C): 37.1 (26 Apr 2024 04:38), Max: 37.3 (26 Apr 2024 00:10)  T(F): 98.7 (26 Apr 2024 04:38), Max: 99.1 (26 Apr 2024 00:10)  HR: 63 (26 Apr 2024 04:38) (60 - 86)  BP: 116/64 (26 Apr 2024 04:38) (105/64 - 138/80)  BP(mean): 77 (25 Apr 2024 12:45) (76 - 84)  RR: 18 (26 Apr 2024 04:38) (15 - 18)  SpO2: 99% (26 Apr 2024 04:38) (97% - 100%)    Parameters below as of 26 Apr 2024 04:38  Patient On (Oxygen Delivery Method): room air    Exam:  Gen: No apparent distress.  Abdomen: Soft and nontender.  LLE: Trilam splint in place. Dressing (ACE) clean, dry, and intact.  LLE: Motor intact EHL/FHL. Sensation grossly intact to light touch in the distal extremities.  Compartments soft and nontender. Extremity warm, cap refill < 3 seconds.  RLE: Motor intact EHL/FHL/TA/GS. Sensation grossly intact to light touch in the distal extremities.  Compartments soft and nontender. Extremity warm. DP +.

## 2024-04-26 NOTE — DISCHARGE NOTE PROVIDER - NSDCMRMEDTOKEN_GEN_ALL_CORE_FT
standard wheelchair with elevated leg rest: s/p Left ankle fx ORIF   acetaminophen 325 mg oral tablet: 2 tab(s) orally every 6 hours x ONE (1) WEEK, then as needed  aspirin 81 mg oral delayed release tablet: 1 tab(s) orally 2 times a day x (4)  WEEKS Total (blood thinner) then STOP  Multiple Vitamins oral tablet: 1 tab(s) orally once a day  oxyCODONE 5 mg oral tablet: 1 tab(s) orally every 4 hours as needed for  severe pain MDD: 6  pantoprazole 40 mg oral delayed release tablet: 1 tab(s) orally once a day (before a meal)  senna leaf extract oral tablet: 2 tab(s) orally once a day (at bedtime) while on pain medications  standard wheelchair with elevated leg rest: s/p Left ankle fx ORIF
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
## 2024-04-26 NOTE — OCCUPATIONAL THERAPY INITIAL EVALUATION ADULT - LEVEL OF INDEPENDENCE: SUPINE/SIT, REHAB EVAL
Please take the antibiotics as prescribed for sinusitis. Please also use Flonase and Mucinex for nasal congestion. May also use over the counter decongestants. You can also try using a [place] pot/saline rinses for congestion. Use Tylenol or Motrin for pain or fever. If you develop any shortness of breath, chest pain, severe headache, fever that does not resolve with medications or any other worsening or concerning symptoms you should go to the emergency department. Otherwise follow-up with your primary care doctor.
independent

## 2024-04-26 NOTE — PROGRESS NOTE ADULT - NS ATTEND AMEND GEN_ALL_CORE FT
I agree with the above note and have personally seen and examined this patient. All pertinent films have been reviewed. Please refer to clinical documentation of the history, physical examinations, data summary, and both assessment and plan as documented above and with which I agree.    Igor Martinez MD  Attending Orthopedic Surgeon
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
## 2024-04-27 RX ADMIN — OXYCODONE HYDROCHLORIDE 10 MILLIGRAM(S): 5 TABLET ORAL at 06:05

## 2024-04-27 RX ADMIN — Medication 650 MILLIGRAM(S): at 05:35

## 2024-04-27 RX ADMIN — POLYETHYLENE GLYCOL 3350 17 GRAM(S): 17 POWDER, FOR SOLUTION ORAL at 11:56

## 2024-04-27 RX ADMIN — OXYCODONE HYDROCHLORIDE 10 MILLIGRAM(S): 5 TABLET ORAL at 05:34

## 2024-04-27 RX ADMIN — Medication 81 MILLIGRAM(S): at 05:35

## 2024-04-27 RX ADMIN — Medication 650 MILLIGRAM(S): at 01:00

## 2024-04-27 RX ADMIN — OXYCODONE HYDROCHLORIDE 10 MILLIGRAM(S): 5 TABLET ORAL at 10:41

## 2024-04-27 RX ADMIN — Medication 650 MILLIGRAM(S): at 12:56

## 2024-04-27 RX ADMIN — Medication 650 MILLIGRAM(S): at 06:05

## 2024-04-27 RX ADMIN — Medication 650 MILLIGRAM(S): at 19:48

## 2024-04-27 RX ADMIN — PANTOPRAZOLE SODIUM 40 MILLIGRAM(S): 20 TABLET, DELAYED RELEASE ORAL at 05:35

## 2024-04-27 RX ADMIN — Medication 650 MILLIGRAM(S): at 00:00

## 2024-04-27 RX ADMIN — Medication 650 MILLIGRAM(S): at 18:48

## 2024-04-27 RX ADMIN — OXYCODONE HYDROCHLORIDE 10 MILLIGRAM(S): 5 TABLET ORAL at 11:41

## 2024-04-27 RX ADMIN — Medication 1 TABLET(S): at 11:56

## 2024-04-27 RX ADMIN — Medication 81 MILLIGRAM(S): at 18:48

## 2024-04-27 RX ADMIN — Medication 650 MILLIGRAM(S): at 11:56

## 2024-04-27 NOTE — PROGRESS NOTE ADULT - SUBJECTIVE AND OBJECTIVE BOX
Patient is a 29y old  Male who presents with a chief complaint of L ankle pain  Patient s/p ORIF left ankle POD#2  Patient resting without complaints.  No chest pain, SOB, N/V.    T(C): 36.8 (04-27-24 @ 04:20), Max: 37 (04-26-24 @ 20:34)  HR: 64 (04-27-24 @ 04:20) (64 - 75)  BP: 131/70 (04-27-24 @ 04:20) (115/67 - 131/70)  RR: 18 (04-27-24 @ 04:20) (18 - 18)  SpO2: 98% (04-27-24 @ 04:20) (97% - 100%)    Exam:  Alert and Oriented, No Acute Distress  Cards: +S1/S2, RRR  Pulm: CTAB  Lower Extremities: Left L/E in Tri-Camilo splint, moving digits  Right L/E  +DP Pulse                        11.7   8.63  )-----------( 223      ( 26 Apr 2024 06:39 )             34.4    04-26  139  |  105  |  7   ----------------------------<  115<H>  4.1   |  25  |  0.57  Ca    8.5      26 Apr 2024 06:39

## 2024-04-27 NOTE — PROGRESS NOTE ADULT - ASSESSMENT
ORIF left ankle POD#2 NWB LY, d/c planning  Hannah Dhillon PA-C  Orthopaedic Surgery  Team pager [phone]  [facility] [phone]  [phone]

## 2024-04-28 RX ADMIN — Medication 650 MILLIGRAM(S): at 05:14

## 2024-04-28 RX ADMIN — Medication 650 MILLIGRAM(S): at 06:14

## 2024-04-28 RX ADMIN — Medication 650 MILLIGRAM(S): at 18:26

## 2024-04-28 RX ADMIN — POLYETHYLENE GLYCOL 3350 17 GRAM(S): 17 POWDER, FOR SOLUTION ORAL at 13:47

## 2024-04-28 RX ADMIN — Medication 650 MILLIGRAM(S): at 17:26

## 2024-04-28 RX ADMIN — Medication 81 MILLIGRAM(S): at 06:14

## 2024-04-28 RX ADMIN — Medication 650 MILLIGRAM(S): at 14:46

## 2024-04-28 RX ADMIN — Medication 1 TABLET(S): at 13:47

## 2024-04-28 RX ADMIN — PANTOPRAZOLE SODIUM 40 MILLIGRAM(S): 20 TABLET, DELAYED RELEASE ORAL at 06:15

## 2024-04-28 RX ADMIN — Medication 650 MILLIGRAM(S): at 13:46

## 2024-04-28 RX ADMIN — Medication 81 MILLIGRAM(S): at 17:26

## 2024-04-28 NOTE — PROGRESS NOTE ADULT - SUBJECTIVE AND OBJECTIVE BOX
Post op Day [3]    Patient resting without complaints.  No chest pain, SOB, N/V.    T(C): 36.7 (04-28-24 @ 08:08), Max: 37.1 (04-27-24 @ 16:41)  HR: 70 (04-28-24 @ 08:08) (55 - 72)  BP: 107/70 (04-28-24 @ 08:08) (107/70 - 149/93)  RR: 18 (04-28-24 @ 08:08) (18 - 18)  SpO2: 99% (04-28-24 @ 08:08) (98% - 99%)  Wt(kg): --    Exam:  Alert and Oriented, No Acute Distress  Cardiac: Normal S1 & S2, RRR, No murmurs, rubs or gallops appreciated.  Pulmonary: 18bpm, normal breathing effort, no retractions, diminished lung sounds appreciated.  Bronchial/Vesicular lungs sounds appreciated throughout all lung lobes.  Lower Extremities: L Ankle  Dressing: C/D/I   Compartments Soft and compressible  Wiggles digits 1-5  SILT  Normal blanching and capillary refill digits 1-5

## 2024-04-28 NOTE — PROGRESS NOTE ADULT - ASSESSMENT
A/p: 29y Male POD#3 s/p L Bimalleolar Fx ORIF.  VSS. NAD.    PT/OT-NWB LLE in Trilam splint  IS  DVT PPx:  ASA 81mg PO BID  Pain Control  Continue Current Tx.  Dispo plan: Undomiciled,  following.    Michael Minor PA-C  Orthopedic Surgery Team  Team Pager: #[phone]

## 2024-04-29 VITALS
DIASTOLIC BLOOD PRESSURE: 73 MMHG | TEMPERATURE: 99 F | OXYGEN SATURATION: 98 % | HEART RATE: 61 BPM | RESPIRATION RATE: 18 BRPM | SYSTOLIC BLOOD PRESSURE: 121 MMHG

## 2024-04-29 RX ADMIN — Medication 81 MILLIGRAM(S): at 05:04

## 2024-04-29 RX ADMIN — POLYETHYLENE GLYCOL 3350 17 GRAM(S): 17 POWDER, FOR SOLUTION ORAL at 11:31

## 2024-04-29 RX ADMIN — Medication 650 MILLIGRAM(S): at 11:31

## 2024-04-29 RX ADMIN — Medication 1 TABLET(S): at 11:31

## 2024-04-29 RX ADMIN — Medication 650 MILLIGRAM(S): at 05:04

## 2024-04-29 RX ADMIN — Medication 650 MILLIGRAM(S): at 06:04

## 2024-04-29 RX ADMIN — PANTOPRAZOLE SODIUM 40 MILLIGRAM(S): 20 TABLET, DELAYED RELEASE ORAL at 05:04

## 2024-04-29 RX ADMIN — Medication 650 MILLIGRAM(S): at 12:31

## 2024-04-29 NOTE — PROGRESS NOTE ADULT - SUBJECTIVE AND OBJECTIVE BOX
ORTHO PROGRESS NOTE    LanguageLine : [place] African (Faye #[id number])    Patient is status post ORIF bimalleolar fracture of left ankle, POD # 4  Patient seen and examined at bedside. Patient denies SOB, CP, dizziness, HA, N/V/D.  Patient reports pain well controlled.    Vital Signs Last 24 Hrs  T(C): 36.7 (29 Apr 2024 04:30), Max: 37.3 (28 Apr 2024 13:25)  T(F): 98.1 (29 Apr 2024 04:30), Max: 99.1 (28 Apr 2024 13:25)  HR: 73 (29 Apr 2024 04:30) (58 - 73)  BP: 106/67 (29 Apr 2024 04:30) (106/67 - 125/72)  BP(mean): --  RR: 18 (29 Apr 2024 04:30) (16 - 18)  SpO2: 99% (29 Apr 2024 04:30) (97% - 99%)    Parameters below as of 29 Apr 2024 04:30  Patient On (Oxygen Delivery Method): room air    Exam:  Gen: No apparent distress.  LLE: Trilam splint in place. Dressing (ACE) clean, dry, and intact.  LLE: Motor intact EHL/FHL. Sensation grossly intact to light touch in the distal extremities.  Compartments soft and nontender. Extremity warm, cap refill < 3 seconds.  RLE: Motor intact EHL/FHL/TA/GS. Sensation grossly intact to light touch in the distal extremities.  Compartments soft and nontender. Extremity warm. DP +.

## 2024-04-29 NOTE — DISCHARGE NOTE NURSING/CASE MANAGEMENT/SOCIAL WORK - NSDCPETBCESMAN_GEN_ALL_CORE
If you are a smoker, it is important for your health to stop smoking. Please be aware that second hand smoke is also harmful. Clindamycin Counseling: I counseled the patient regarding use of clindamycin as an antibiotic for prophylactic and/or therapeutic purposes. Clindamycin is active against numerous classes of bacteria, including skin bacteria. Side effects may include nausea, diarrhea, gastrointestinal upset, rash, hives, yeast infections, and in rare cases, colitis.

## 2024-04-29 NOTE — DISCHARGE NOTE NURSING/CASE MANAGEMENT/SOCIAL WORK - PATIENT PORTAL LINK FT
You can access the FollowMyHealth Patient Portal offered by [facility] by registering at the following website: [email or web address]. By joining [employer]’s FollowMyHealth portal, you will also be able to view your health information using other applications (apps) compatible with our system.

## 2024-04-29 NOTE — DISCHARGE NOTE NURSING/CASE MANAGEMENT/SOCIAL WORK - NSDCPEFALRISK_GEN_ALL_CORE
For information on Fall & Injury Prevention, visit: [email or web address] OR  [email or web address] OR  https://www.cdc.gov/steadi/patient.html
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
## 2024-08-22 PROCEDURE — 85027 COMPLETE CBC AUTOMATED: CPT

## 2024-08-22 PROCEDURE — 85610 PROTHROMBIN TIME: CPT

## 2024-08-22 PROCEDURE — 73610 X-RAY EXAM OF ANKLE: CPT

## 2024-08-22 PROCEDURE — 71045 X-RAY EXAM CHEST 1 VIEW: CPT

## 2024-08-22 PROCEDURE — 85018 HEMOGLOBIN: CPT

## 2024-08-22 PROCEDURE — 83605 ASSAY OF LACTIC ACID: CPT

## 2024-08-22 PROCEDURE — 97165 OT EVAL LOW COMPLEX 30 MIN: CPT

## 2024-08-22 PROCEDURE — 93005 ELECTROCARDIOGRAM TRACING: CPT

## 2024-08-22 PROCEDURE — 85014 HEMATOCRIT: CPT

## 2024-08-22 PROCEDURE — 84295 ASSAY OF SERUM SODIUM: CPT

## 2024-08-22 PROCEDURE — 86900 BLOOD TYPING SEROLOGIC ABO: CPT

## 2024-08-22 PROCEDURE — 76377 3D RENDER W/INTRP POSTPROCES: CPT

## 2024-08-22 PROCEDURE — 83690 ASSAY OF LIPASE: CPT

## 2024-08-22 PROCEDURE — 86901 BLOOD TYPING SEROLOGIC RH(D): CPT

## 2024-08-22 PROCEDURE — 96375 TX/PRO/DX INJ NEW DRUG ADDON: CPT

## 2024-08-22 PROCEDURE — 82947 ASSAY GLUCOSE BLOOD QUANT: CPT

## 2024-08-22 PROCEDURE — 82330 ASSAY OF CALCIUM: CPT

## 2024-08-22 PROCEDURE — 73590 X-RAY EXAM OF LOWER LEG: CPT

## 2024-08-22 PROCEDURE — 80048 BASIC METABOLIC PNL TOTAL CA: CPT

## 2024-08-22 PROCEDURE — 73502 X-RAY EXAM HIP UNI 2-3 VIEWS: CPT

## 2024-08-22 PROCEDURE — 84132 ASSAY OF SERUM POTASSIUM: CPT

## 2024-08-22 PROCEDURE — 97162 PT EVAL MOD COMPLEX 30 MIN: CPT

## 2024-08-22 PROCEDURE — 99285 EMERGENCY DEPT VISIT HI MDM: CPT | Mod: 25

## 2024-08-22 PROCEDURE — 85730 THROMBOPLASTIN TIME PARTIAL: CPT

## 2024-08-22 PROCEDURE — 96374 THER/PROPH/DIAG INJ IV PUSH: CPT

## 2024-08-22 PROCEDURE — 73630 X-RAY EXAM OF FOOT: CPT

## 2024-08-22 PROCEDURE — 80307 DRUG TEST PRSMV CHEM ANLYZR: CPT

## 2024-08-22 PROCEDURE — 82435 ASSAY OF BLOOD CHLORIDE: CPT

## 2024-08-22 PROCEDURE — 84100 ASSAY OF PHOSPHORUS: CPT

## 2024-08-22 PROCEDURE — 82803 BLOOD GASES ANY COMBINATION: CPT

## 2024-08-22 PROCEDURE — 86850 RBC ANTIBODY SCREEN: CPT

## 2024-08-22 PROCEDURE — 76000 FLUOROSCOPY <1 HR PHYS/QHP: CPT

## 2024-08-22 PROCEDURE — 73130 X-RAY EXAM OF HAND: CPT

## 2024-08-22 PROCEDURE — 70450 CT HEAD/BRAIN W/O DYE: CPT | Mod: MC

## 2024-08-22 PROCEDURE — C1713: CPT

## 2024-08-22 PROCEDURE — 80053 COMPREHEN METABOLIC PANEL: CPT

## 2024-08-22 PROCEDURE — C1769: CPT

## 2024-08-22 PROCEDURE — 72125 CT NECK SPINE W/O DYE: CPT | Mod: MC

## 2024-08-22 PROCEDURE — 85025 COMPLETE CBC W/AUTO DIFF WBC: CPT

## 2024-08-22 PROCEDURE — 74177 CT ABD & PELVIS W/CONTRAST: CPT | Mod: MC

## 2024-08-22 PROCEDURE — 73552 X-RAY EXAM OF FEMUR 2/>: CPT

## 2024-08-22 PROCEDURE — 73700 CT LOWER EXTREMITY W/O DYE: CPT | Mod: MC

## 2024-08-22 PROCEDURE — 83735 ASSAY OF MAGNESIUM: CPT

## 2024-08-22 PROCEDURE — 73562 X-RAY EXAM OF KNEE 3: CPT

## 2024-08-22 PROCEDURE — 71260 CT THORAX DX C+: CPT | Mod: MC

## 2025-07-08 NOTE — ED PROVIDER NOTE - CARE PLAN
Spoke to pt, ans'd q's  Pt more eager to proceed w PHACO OU, OD 1st      recommend [facility]O OD 1st 11/18/25, OS 12/02/25 @ [facility]     --check dates with patient **        Discussed and recommended the ordered surgical procedure(s):  Please see orders.    Surgery scheduling requirements include:  Date of surgery:  as above**   Facility: [facility]  Admission Type:  Outpatient  Anesthesia: Local with MAC  Length of Appt for Procedure: 40 Minutes  A-Scan: Yes  Preoperative History and Physical:  Susi Ivey MD  Special Equipment: Yes, DUOVISC**   Postoperative visits:   1st eye: 1 day, 1 week  2nd eye: 1 day, 1 week, 3 weeks    Additional Comment:     Recommend cont/INCR artificial tears 3X/D prn (Refresh, Genteal, other)     1 Principal Discharge DX:	Tibia fracture   Principal Discharge DX:	Tibia fracture  Goal:	L closed Displaced transverse fracture of the medial malleolus. L fibula fx  Assessment and plan of treatment:	Displaced transverse fracture of the medial malleolus.    Acute, comminuted displaced fracture of the distal fibular diaphysis with   mild foreshortening at the fracture site, anterolateral displacement of   the distal fracture fragment, and apex anterior angulation.    Widening of the syndesmosis, compatible with syndesmotic injury.
